# Patient Record
Sex: MALE | Race: WHITE
[De-identification: names, ages, dates, MRNs, and addresses within clinical notes are randomized per-mention and may not be internally consistent; named-entity substitution may affect disease eponyms.]

---

## 2019-09-10 ENCOUNTER — HOSPITAL ENCOUNTER (EMERGENCY)
Dept: HOSPITAL 92 - ERS | Age: 19
LOS: 1 days | Discharge: HOME | End: 2019-09-11
Payer: COMMERCIAL

## 2019-09-10 DIAGNOSIS — K12.2: Primary | ICD-10-CM

## 2019-09-10 DIAGNOSIS — Z79.899: ICD-10-CM

## 2019-09-10 PROCEDURE — 96365 THER/PROPH/DIAG IV INF INIT: CPT

## 2019-09-10 PROCEDURE — 96375 TX/PRO/DX INJ NEW DRUG ADDON: CPT

## 2019-09-11 NOTE — CON
DATE OF CONSULTATION:  09/10/2019



CONSULTING PHYSICIAN:  Dr. Dumont.



REASON FOR CONSULTATION:  Possible peritonsillar abscess.



HISTORY OF PRESENT ILLNESS:  Mr. Velasquez is a 18-year-old male who has had a week

history of upper respiratory tract infection that has progressively worsened to a

sore throat and now having significant pain and difficulty swallowing.  He was seen

yesterday in the ER and treated with antibiotics and a steroid burst, felt better

for a while, but after the steroids were off, started having increased difficulty

again, was seen again today in the ER and had a CT scan performed and was read as

showing evidence of a peritonsillar abscess and was sent here for further evaluation

and treatment of this peritonsillar abscess.  He is complaining of pain on

swallowing and difficulty swallowing.  He has not had any difficulty breathing.  He

has been running fever and complaining of pain on the left side of his face and ear.

 He does not have any significant history of tonsil problems and does have a history

of ear infections.  He denies any other significant medical problems.  Please see

his ER note for further details of his past medical history and review of systems

reviewed on this date. 



PHYSICAL EXAMINATION:

GENERAL:  Well-developed, well-nourished white male, does not appear to be any

significant distress. 

HEAD:  Normocephalic, atraumatic.  Eyes, pupils are equal, round, reactive to light.

 Extraocular movements are intact.  Ears, both tympanic membranes are intact, mobile

and clear.  Nose shows normal mucosa.  Septum is midline.  No purulent drainage or

infection is noted.  Oral cavity and oropharynx shows a 2+ tonsil on the right side

with some moderate exudate and a 4+ tonsil on the left side with significant exudate

and erythema.  There is no swelling of the soft palate or the anterior tonsillar

pillar and no evidence of any trismus. 

NECK:  Shows a 3 cm left jugular digastric node that is tender to the touch 2 cm on

the right side. 

LUNGS:  Clear to auscultation.   

HEART:  Regular rate without murmur or gallop.   

NEUROLOGIC:  Cranial nerves 3-12 are intact.



IMAGING:  Review of the CT scan shows indeed a phlegmon inside the left tonsil.  It

is not in the peritonsillar space and is not displacing the tonsil.  There does not

appear to be any abscess in the left and the peritonsillar space on either side.

There is some significant lymphadenopathy in the jugulodigastric region.  No other

significant abnormalities were noted. 



IMPRESSION:  Acute tonsillitis.  It does not appear to have turned into a

peritonsillar abscesses at this time, although he does have what appears to be a

phlegmon in his left tonsil.  I have recommended treatment with clindamycin,

steroids for the next several days to help with swelling and pain control with

narcotic pain medicine and hydration.  Certainly if his symptoms continue to worsen,

it may turn into a peritonsillar abscess that may need to be drained, but do not see

anything that can be drained at this time.  We will observe and he will contact me,

if he has any further problems. 







Job ID:  [id number]